# Patient Record
Sex: MALE | Race: WHITE | HISPANIC OR LATINO | Employment: FULL TIME | URBAN - METROPOLITAN AREA
[De-identification: names, ages, dates, MRNs, and addresses within clinical notes are randomized per-mention and may not be internally consistent; named-entity substitution may affect disease eponyms.]

---

## 2018-01-10 ENCOUNTER — HOSPITAL ENCOUNTER (EMERGENCY)
Facility: HOSPITAL | Age: 58
Discharge: HOME | End: 2018-01-10
Attending: EMERGENCY MEDICINE

## 2018-01-10 VITALS
SYSTOLIC BLOOD PRESSURE: 159 MMHG | TEMPERATURE: 99 F | RESPIRATION RATE: 18 BRPM | OXYGEN SATURATION: 99 % | HEIGHT: 64 IN | BODY MASS INDEX: 24.41 KG/M2 | WEIGHT: 143 LBS | DIASTOLIC BLOOD PRESSURE: 87 MMHG | HEART RATE: 88 BPM

## 2018-01-10 DIAGNOSIS — L03.113 CELLULITIS OF RIGHT HAND: Primary | ICD-10-CM

## 2018-01-10 DIAGNOSIS — T14.8XXA PUNCTURE WOUND: ICD-10-CM

## 2018-01-10 LAB
ALBUMIN SERPL BCP-MCNC: 3.9 G/DL
ALP SERPL-CCNC: 93 U/L
ALT SERPL W/O P-5'-P-CCNC: 56 U/L
ANION GAP SERPL CALC-SCNC: 11 MMOL/L
AST SERPL-CCNC: 47 U/L
BASOPHILS # BLD AUTO: 0.03 K/UL
BASOPHILS NFR BLD: 0.2 %
BILIRUB SERPL-MCNC: 0.9 MG/DL
BUN SERPL-MCNC: 12 MG/DL
CALCIUM SERPL-MCNC: 9.3 MG/DL
CHLORIDE SERPL-SCNC: 101 MMOL/L
CO2 SERPL-SCNC: 22 MMOL/L
CREAT SERPL-MCNC: 0.9 MG/DL
DIFFERENTIAL METHOD: ABNORMAL
EOSINOPHIL # BLD AUTO: 0.1 K/UL
EOSINOPHIL NFR BLD: 0.4 %
ERYTHROCYTE [DISTWIDTH] IN BLOOD BY AUTOMATED COUNT: 11.7 %
EST. GFR  (AFRICAN AMERICAN): >60 ML/MIN/1.73 M^2
EST. GFR  (NON AFRICAN AMERICAN): >60 ML/MIN/1.73 M^2
GLUCOSE SERPL-MCNC: 183 MG/DL
HCT VFR BLD AUTO: 41.5 %
HGB BLD-MCNC: 13.9 G/DL
LYMPHOCYTES # BLD AUTO: 1.1 K/UL
LYMPHOCYTES NFR BLD: 8.2 %
MCH RBC QN AUTO: 33 PG
MCHC RBC AUTO-ENTMCNC: 33.5 G/DL
MCV RBC AUTO: 99 FL
MONOCYTES # BLD AUTO: 0.9 K/UL
MONOCYTES NFR BLD: 6.5 %
NEUTROPHILS # BLD AUTO: 11.2 K/UL
NEUTROPHILS NFR BLD: 84.4 %
PLATELET # BLD AUTO: 240 K/UL
PMV BLD AUTO: 9.7 FL
POTASSIUM SERPL-SCNC: 3.8 MMOL/L
PROT SERPL-MCNC: 8.5 G/DL
RBC # BLD AUTO: 4.21 M/UL
SODIUM SERPL-SCNC: 134 MMOL/L
WBC # BLD AUTO: 13.31 K/UL

## 2018-01-10 PROCEDURE — 87040 BLOOD CULTURE FOR BACTERIA: CPT

## 2018-01-10 PROCEDURE — 96365 THER/PROPH/DIAG IV INF INIT: CPT

## 2018-01-10 PROCEDURE — 99284 EMERGENCY DEPT VISIT MOD MDM: CPT | Mod: 25

## 2018-01-10 PROCEDURE — 25000003 PHARM REV CODE 250: Performed by: EMERGENCY MEDICINE

## 2018-01-10 PROCEDURE — 85025 COMPLETE CBC W/AUTO DIFF WBC: CPT

## 2018-01-10 PROCEDURE — 80053 COMPREHEN METABOLIC PANEL: CPT

## 2018-01-10 PROCEDURE — S0077 INJECTION, CLINDAMYCIN PHOSP: HCPCS | Performed by: EMERGENCY MEDICINE

## 2018-01-10 RX ORDER — CLINDAMYCIN PHOSPHATE 600 MG/50ML
600 INJECTION, SOLUTION INTRAVENOUS
Status: COMPLETED | OUTPATIENT
Start: 2018-01-10 | End: 2018-01-10

## 2018-01-10 RX ORDER — SULFAMETHOXAZOLE AND TRIMETHOPRIM 800; 160 MG/1; MG/1
2 TABLET ORAL 2 TIMES DAILY
Qty: 40 TABLET | Refills: 0 | Status: SHIPPED | OUTPATIENT
Start: 2018-01-10 | End: 2018-01-20

## 2018-01-10 RX ADMIN — CLINDAMYCIN IN 5 PERCENT DEXTROSE 600 MG: 12 INJECTION, SOLUTION INTRAVENOUS at 04:01
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
## 2018-01-10 NOTE — DISCHARGE INSTRUCTIONS
You have cellulitis of your right hand.  Please take your antibiotics as prescribed.  If you note that the swelling is getting worse, redness is increasing or you start to have drainage please return immediately to the emergency department for evaluation.  Otherwise follow up with your primary care physician in 2 days for recheck.  If you cannot follow up with your primary care physician he can return to the emergency department for recheck.  Refer to the additional material provided for further information including when to return to the emergency department.

## 2018-01-15 LAB — BACTERIA BLD CULT: NORMAL

## 2022-01-01 ENCOUNTER — HOSPITAL ENCOUNTER (EMERGENCY)
Facility: HOSPITAL | Age: 62
End: 2022-11-17
Attending: EMERGENCY MEDICINE

## 2022-01-01 VITALS — DIASTOLIC BLOOD PRESSURE: 39 MMHG | SYSTOLIC BLOOD PRESSURE: 96 MMHG

## 2022-01-01 DIAGNOSIS — I46.9 CARDIAC ARREST: Primary | ICD-10-CM

## 2022-01-01 PROCEDURE — 27100171 HC OXYGEN HIGH FLOW UP TO 24 HOURS

## 2022-01-01 PROCEDURE — 92950 HEART/LUNG RESUSCITATION CPR: CPT

## 2022-01-01 PROCEDURE — 99291 CRITICAL CARE FIRST HOUR: CPT | Mod: 25

## 2022-01-01 PROCEDURE — 99900035 HC TECH TIME PER 15 MIN (STAT)

## 2022-11-17 NOTE — ED NOTES
Spoke to [facility]. Pt. Information given and  en route to hospital.

## 2022-11-17 NOTE — ED PROVIDER NOTES
Encounter Date: 11/17/2022    SCRIBE #1 NOTE: I, Robbei Eugene Jr, am scribing for, and in the presence of,  Robb Gould MD. I have scribed the following portions of the note - Other sections scribed: HPI, ROS, MDM.     History     Chief Complaint   Patient presents with    Cardiac Arrest     Arrives via ems s/p cardiac arrest. Cpr in progress.      Milo Ponce is a 62 y.o. male who has no past medical history on file.The patient presents to the emergency department via EMS due to a cardiac arrest; onset prior to arrival. EMS personnel reported CPR was started at the scene when the patient was found unresponsive at his place of employment.  He was administered Narcan via police officers with no response. EMS personnel arrived to the scene, intubated the patient and administered a total of seven amps of epinephrine. There was a very brief return of the pulse. In addition to this, he was given two amps of D50 & one amp of sodium bicarb. Patient presents to the emergency department pulseless and apneic.            The history is provided by the patient. No  was used.   Review of patient's allergies indicates:   Allergen Reactions    Aspirin      No past medical history on file.  No past surgical history on file.  No family history on file.  Social History     Tobacco Use    Smoking status: Former    Smokeless tobacco: Never   Substance Use Topics    Alcohol use: Yes     Comment: social / weekends     Review of Systems   Unable to perform ROS: Patient unresponsive (Review of systems is limited due to patient being unresponsive.)   Cardiovascular:         Positive cardiac arrest.     Physical Exam     Initial Vitals [11/17/22 1215]   BP Pulse Resp Temp SpO2   (!) 96/39 -- -- -- --      MAP       --         Physical Exam    Nursing note and vitals reviewed.  Constitutional:   Unresponsive.   HENT:   Head: Atraumatic.   Eyes:   Pupils 3 mm bilaterally, nonreactive.  Absent corneal reflex.    Cardiovascular:            No cardiac activity.   Pulmonary/Chest:   Slightly diminished breath sounds with bagging.   Abdominal: Abdomen is soft. He exhibits no distension.     Neurological:   Unresponsive.   Skin: Skin is dry.       ED Course   Critical Care    Date/Time: 2022 1:47 PM  Performed by: Robb Gould MD  Authorized by: Robb Gould MD   Direct patient critical care time: 20 minutes  Additional history critical care time: 10 minutes  Consult with family critical care time: 10 minutes  Total critical care time (exclusive of procedural time) : 40 minutes  Critical care was time spent personally by me on the following activities: examination of patient, obtaining history from patient or surrogate, ordering and performing treatments and interventions and re-evaluation of patient's condition.      Labs Reviewed - No data to display       Imaging Results    None          Medications - No data to display  Medical Decision Making:   History:   Old Medical Records: I decided to obtain old medical records.  Initial Assessment:   62-year-old man presenting to the ED full cardiac arrest.        ED Management:  Patient remained pulseless in spite of continued resuscitative efforts.  He has had no spontaneous breathing.  Code called at 12:20 p.m..        Scribe Attestation:   Scribe #1: I performed the above scribed service and the documentation accurately describes the services I performed. I attest to the accuracy of the note.                 I, Dr. Robb Gould, personally performed the services described in this documentation. All medical record entries made by the scribe were at my direction and in my presence. I have reviewed the chart and agree that the record reflects my personal performance and is accurate and complete. Robb Gould MD.  1:46 PM 2022    Clinical Impression:   Final diagnoses:  [I46.9] Cardiac arrest (Primary)        ED Disposition Condition                      Robb Gould MD  11/17/22 [id number]

## 2022-11-17 NOTE — ED NOTES
Spoke to KARINE . After confirmation of location of , referred to [facility] . [facility] [id number].
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
## 2022-11-17 NOTE — ED NOTES
Family arriving, hospital [facility] is at bedside with family at this time. Spoke to Chastity from [employer] [facility]. Not suitable for donation. Referral number is [id number].

## 2022-11-17 NOTE — ED NOTES
Next of kin contact: Divina Aquino-Daughter.                                  Phone number: [phone].

## 2022-11-17 NOTE — ED NOTES
Pt. Pulse check reveals PEA. No pulse obtained from efforts. Code called and pt. Pronounced by Dr. Gould.

## 2022-11-17 NOTE — ED NOTES
"Pt. Wife reports a history of severe alcoholism and Hard drug" use. Recently hospitalized for Liver disease and a persistent cough.   "
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
